# Patient Record
Sex: MALE | Race: WHITE | Employment: FULL TIME | URBAN - METROPOLITAN AREA
[De-identification: names, ages, dates, MRNs, and addresses within clinical notes are randomized per-mention and may not be internally consistent; named-entity substitution may affect disease eponyms.]

---

## 2017-11-07 ENCOUNTER — HOSPITAL ENCOUNTER (EMERGENCY)
Age: 47
Discharge: HOME | End: 2017-11-07
Attending: EMERGENCY MEDICINE
Payer: COMMERCIAL

## 2017-11-07 ENCOUNTER — APPOINTMENT (OUTPATIENT)
Dept: GENERAL RADIOLOGY | Age: 47
End: 2017-11-07
Attending: EMERGENCY MEDICINE
Payer: COMMERCIAL

## 2017-11-07 VITALS
DIASTOLIC BLOOD PRESSURE: 88 MMHG | BODY MASS INDEX: 27.17 KG/M2 | HEIGHT: 73 IN | SYSTOLIC BLOOD PRESSURE: 132 MMHG | WEIGHT: 205 LBS | RESPIRATION RATE: 16 BRPM | OXYGEN SATURATION: 98 % | TEMPERATURE: 98.4 F | HEART RATE: 94 BPM

## 2017-11-07 DIAGNOSIS — R07.89 ATYPICAL CHEST PAIN: Primary | ICD-10-CM

## 2017-11-07 LAB
ALBUMIN SERPL-MCNC: 4.2 G/DL (ref 3.5–5)
ALBUMIN/GLOB SERPL: 1 {RATIO} (ref 1.2–3.5)
ALP SERPL-CCNC: 114 U/L (ref 50–136)
ALT SERPL-CCNC: 56 U/L (ref 12–65)
ANION GAP SERPL CALC-SCNC: 15 MMOL/L (ref 7–16)
AST SERPL-CCNC: 66 U/L (ref 15–37)
BASOPHILS # BLD: 0 K/UL (ref 0–0.2)
BASOPHILS NFR BLD: 0 % (ref 0–2)
BILIRUB SERPL-MCNC: 0.9 MG/DL (ref 0.2–1.1)
BUN SERPL-MCNC: 17 MG/DL (ref 6–23)
CALCIUM SERPL-MCNC: 9.1 MG/DL (ref 8.3–10.4)
CHLORIDE SERPL-SCNC: 100 MMOL/L (ref 98–107)
CO2 SERPL-SCNC: 20 MMOL/L (ref 21–32)
CREAT SERPL-MCNC: 1 MG/DL (ref 0.8–1.5)
DIFFERENTIAL METHOD BLD: ABNORMAL
EOSINOPHIL # BLD: 0.5 K/UL (ref 0–0.8)
EOSINOPHIL NFR BLD: 5 % (ref 0.5–7.8)
ERYTHROCYTE [DISTWIDTH] IN BLOOD BY AUTOMATED COUNT: 13.5 % (ref 11.9–14.6)
GLOBULIN SER CALC-MCNC: 4.3 G/DL (ref 2.3–3.5)
GLUCOSE SERPL-MCNC: 283 MG/DL (ref 65–100)
HCT VFR BLD AUTO: 46.2 % (ref 41.1–50.3)
HGB BLD-MCNC: 16.8 G/DL (ref 13.6–17.2)
IMM GRANULOCYTES # BLD: 0 K/UL (ref 0–0.5)
IMM GRANULOCYTES NFR BLD: 0 % (ref 0–5)
LYMPHOCYTES # BLD: 3.4 K/UL (ref 0.5–4.6)
LYMPHOCYTES NFR BLD: 37 % (ref 13–44)
MCH RBC QN AUTO: 29.7 PG (ref 26.1–32.9)
MCHC RBC AUTO-ENTMCNC: 36.4 G/DL (ref 31.4–35)
MCV RBC AUTO: 81.6 FL (ref 79.6–97.8)
MONOCYTES # BLD: 0.8 K/UL (ref 0.1–1.3)
MONOCYTES NFR BLD: 8 % (ref 4–12)
NEUTS SEG # BLD: 4.6 K/UL (ref 1.7–8.2)
NEUTS SEG NFR BLD: 50 % (ref 43–78)
PLATELET # BLD AUTO: 196 K/UL (ref 150–450)
PMV BLD AUTO: 10.2 FL (ref 10.8–14.1)
POTASSIUM SERPL-SCNC: 5.2 MMOL/L (ref 3.5–5.1)
PROT SERPL-MCNC: 8.5 G/DL (ref 6.3–8.2)
RBC # BLD AUTO: 5.66 M/UL (ref 4.23–5.67)
SODIUM SERPL-SCNC: 135 MMOL/L (ref 136–145)
TROPONIN I BLD-MCNC: 0 NG/ML (ref 0.02–0.05)
TROPONIN I SERPL-MCNC: <0.02 NG/ML (ref 0.02–0.05)
WBC # BLD AUTO: 9.3 K/UL (ref 4.3–11.1)

## 2017-11-07 PROCEDURE — 74011250637 HC RX REV CODE- 250/637: Performed by: EMERGENCY MEDICINE

## 2017-11-07 PROCEDURE — 93005 ELECTROCARDIOGRAM TRACING: CPT | Performed by: EMERGENCY MEDICINE

## 2017-11-07 PROCEDURE — 71020 XR CHEST PA LAT: CPT

## 2017-11-07 PROCEDURE — 99284 EMERGENCY DEPT VISIT MOD MDM: CPT | Performed by: EMERGENCY MEDICINE

## 2017-11-07 PROCEDURE — 85025 COMPLETE CBC W/AUTO DIFF WBC: CPT | Performed by: EMERGENCY MEDICINE

## 2017-11-07 PROCEDURE — 84484 ASSAY OF TROPONIN QUANT: CPT | Performed by: EMERGENCY MEDICINE

## 2017-11-07 PROCEDURE — 80053 COMPREHEN METABOLIC PANEL: CPT | Performed by: EMERGENCY MEDICINE

## 2017-11-07 RX ORDER — METFORMIN HYDROCHLORIDE 1000 MG/1
1000 TABLET ORAL 2 TIMES DAILY WITH MEALS
COMMUNITY

## 2017-11-07 RX ORDER — DICLOFENAC SODIUM 50 MG/1
50 TABLET, DELAYED RELEASE ORAL 2 TIMES DAILY
Qty: 14 TAB | Refills: 0 | Status: SHIPPED | OUTPATIENT
Start: 2017-11-07

## 2017-11-07 RX ORDER — MONTELUKAST SODIUM 10 MG/1
10 TABLET ORAL DAILY
COMMUNITY

## 2017-11-07 RX ORDER — GABAPENTIN 600 MG/1
TABLET ORAL 3 TIMES DAILY
COMMUNITY

## 2017-11-07 RX ORDER — CYCLOBENZAPRINE HCL 10 MG
TABLET ORAL
COMMUNITY

## 2017-11-07 RX ORDER — HYDROCODONE BITARTRATE AND ACETAMINOPHEN 5; 325 MG/1; MG/1
1 TABLET ORAL ONCE
Status: COMPLETED | OUTPATIENT
Start: 2017-11-07 | End: 2017-11-07

## 2017-11-07 RX ORDER — ACETAMINOPHEN AND CODEINE PHOSPHATE 300; 30 MG/1; MG/1
1 TABLET ORAL
COMMUNITY

## 2017-11-07 RX ADMIN — HYDROCODONE BITARTRATE AND ACETAMINOPHEN 1 TABLET: 5; 325 TABLET ORAL at 22:25

## 2017-11-08 LAB
ATRIAL RATE: 90 BPM
CALCULATED P AXIS, ECG09: 48 DEGREES
CALCULATED R AXIS, ECG10: 41 DEGREES
CALCULATED T AXIS, ECG11: 36 DEGREES
DIAGNOSIS, 93000: NORMAL
P-R INTERVAL, ECG05: 128 MS
Q-T INTERVAL, ECG07: 380 MS
QRS DURATION, ECG06: 90 MS
QTC CALCULATION (BEZET), ECG08: 464 MS
VENTRICULAR RATE, ECG03: 90 BPM

## 2017-11-08 NOTE — ED PROVIDER NOTES
HPI Comments: 21-year-old gentleman with a history of pain in the lateral left side of his chest that has been present for 2 weeks. Says that taking deep breath makes the pain that her. He says that sometimes changing his position does help but sometimes it does not. Patient says that his pain is not made worse with exertion and is not relieved by rest.  Patient says that he has no known heart problems and he does not smoke. He denies any fevers or vomiting that he has had no cough or chills. Elements of this note were made using speech recognition software. As such, there may be errors of speech recognition present. Patient is a 56 y.o. male presenting with chest pain. The history is provided by the patient. Chest Pain    Pertinent negatives include no abdominal pain, no cough, no diaphoresis, no dizziness, no fever, no headaches, no nausea, no palpitations, no shortness of breath, no vomiting and no weakness. Past Medical History:   Diagnosis Date    Diabetes [facility])        Past Surgical History:   Procedure Laterality Date    HX ORTHOPAEDIC           History reviewed. No pertinent family history. Social History     Social History    Marital status:      Spouse name: N/A    Number of children: N/A    Years of education: N/A     Occupational History    Not on file. Social History Main Topics    Smoking status: Never Smoker    Smokeless tobacco: Never Used    Alcohol use No    Drug use: No    Sexual activity: Yes     Partners: Female     Other Topics Concern    Not on file     Social History Narrative    No narrative on file         ALLERGIES: Review of patient's allergies indicates no known allergies. Review of Systems   Constitutional: Negative for chills, diaphoresis and fever. HENT: Negative for congestion, rhinorrhea and sore throat. Eyes: Negative for redness and visual disturbance.    Respiratory: Negative for cough, chest tightness, shortness of breath and wheezing. Cardiovascular: Positive for chest pain. Negative for palpitations. Gastrointestinal: Negative for abdominal pain, blood in stool, diarrhea, nausea and vomiting. Endocrine: Negative for polydipsia and polyuria. Genitourinary: Negative for dysuria and hematuria. Musculoskeletal: Negative for arthralgias, myalgias and neck stiffness. Skin: Negative for rash. Allergic/Immunologic: Negative for environmental allergies and food allergies. Neurological: Negative for dizziness, weakness and headaches. Hematological: Negative for adenopathy. Does not bruise/bleed easily. Psychiatric/Behavioral: Negative for confusion and sleep disturbance. The patient is not nervous/anxious. Vitals:    11/07/17 2020 11/07/17 2148   BP: (!) 142/91    Pulse: 87    Resp: 18    Temp: 97.9 °F (36.6 °C)    SpO2: 100% 97%   Weight: 93 kg (205 lb)    Height: 6' 1\" (1.854 m)             Physical Exam   Constitutional: He is oriented to person, place, and time. He appears well-developed and well-nourished. HENT:   Head: Normocephalic and atraumatic. Eyes: Conjunctivae and EOM are normal. Pupils are equal, round, and reactive to light. Neck: Normal range of motion. Cardiovascular: Normal rate and regular rhythm. Pulmonary/Chest: Effort normal and breath sounds normal. No respiratory distress. He has no wheezes. He has no rales. He exhibits no tenderness. Abdominal: Soft. Bowel sounds are normal. There is no rebound and no guarding. Musculoskeletal: Normal range of motion. He exhibits no edema or tenderness. Lymphadenopathy:     He has no cervical adenopathy. Neurological: He is alert and oriented to person, place, and time. Skin: Skin is warm and dry. Psychiatric: He has a normal mood and affect. Nursing note and vitals reviewed.        MDM  Number of Diagnoses or Management Options  Diagnosis management comments: Patient's initial EKG is unremarkable and his initial troponin is negative. Patient is perc negative    Patient's repeat troponin is negative. He may have some pleurisy or potentially costochondritis. I will plan to send him home with a prescription for Voltaren.     ED Course       Procedures

## 2017-11-08 NOTE — DISCHARGE INSTRUCTIONS
Return with any fevers, vomiting, difficulty breathing, worsening symptoms, or additional concerns. Follow-up with your primary care doctor for reevaluation.

## 2017-11-08 NOTE — ED TRIAGE NOTES
PT arrived to ED c/o chest pain for the past 2 weeks. PT states taking deep breaths makes pain better and that bending over make his pain worse. PT states pain radiates to his left elbow. PT states he took a tylenol 3 with some relief. PT rates his chest pain an 8 out of 10.  PT denies any Hx of heart problems